# Patient Record
Sex: MALE | Race: OTHER | HISPANIC OR LATINO
[De-identification: names, ages, dates, MRNs, and addresses within clinical notes are randomized per-mention and may not be internally consistent; named-entity substitution may affect disease eponyms.]

---

## 2017-03-07 ENCOUNTER — OTHER (OUTPATIENT)
Age: 22
End: 2017-03-07

## 2019-06-10 ENCOUNTER — EMERGENCY (EMERGENCY)
Facility: HOSPITAL | Age: 24
LOS: 1 days | Discharge: HOME | End: 2019-06-10
Admitting: EMERGENCY MEDICINE
Payer: COMMERCIAL

## 2019-06-10 VITALS
HEIGHT: 77 IN | TEMPERATURE: 98 F | RESPIRATION RATE: 20 BRPM | WEIGHT: 220.02 LBS | SYSTOLIC BLOOD PRESSURE: 121 MMHG | HEART RATE: 64 BPM | OXYGEN SATURATION: 98 % | DIASTOLIC BLOOD PRESSURE: 84 MMHG

## 2019-06-10 PROCEDURE — 99283 EMERGENCY DEPT VISIT LOW MDM: CPT | Mod: 25

## 2019-06-10 PROCEDURE — 99283 EMERGENCY DEPT VISIT LOW MDM: CPT

## 2019-06-10 RX ORDER — POLYMYXIN B SULF/TRIMETHOPRIM 10000-1/ML
1 DROPS OPHTHALMIC (EYE)
Qty: 10 | Refills: 0
Start: 2019-06-10 | End: 2019-06-16

## 2019-06-10 NOTE — ED ADULT NURSE NOTE - OBJECTIVE STATEMENT
22 y/o pt AOx3 arrived to the ED c/o having right eye redness that he noticed when he woke up at 5am. Pt states when he went to bed last night he didn't have any symptoms.

## 2019-06-10 NOTE — ED PROVIDER NOTE - NSFOLLOWUPINSTRUCTIONS_ED_ALL_ED_FT
Conjunctivitis    WHAT YOU NEED TO KNOW:    Conjunctivitis, or pink eye, is inflammation of your conjunctiva. The conjunctiva is a thin tissue that covers the front of your eye and the back of your eyelids. The conjunctiva helps protect your eye and keep it moist. Conjunctivitis may be caused by bacteria, allergies, or a virus. If your conjunctivitis is caused by bacteria, it may get better on its own in about 7 days. Viral conjunctivitis can last up to 3 weeks. Conjunctivitis         DISCHARGE INSTRUCTIONS:    Return to the emergency department if:     You have worsening eye pain.       The swelling in your eye gets worse, even after treatment.       Your vision suddenly becomes worse or you cannot see at all.    Contact your healthcare provider if:     You develop a fever and ear pain.      You have tiny bumps or spots of blood on your eye.      You have questions or concerns about your condition or care.    Manage your symptoms:     Apply a cool compress. Wet a washcloth with cold water and place it on your eye. This will help decrease itching and irritation.      Do not wear contact lenses. They can irritate your eye. Throw away the pair you are using and ask when you can wear them again. Use a new pair of lenses when your healthcare provider says it is okay.       Avoid irritants. Stay away from smoke filled areas. Shield your eyes from wind and sun.       Flush your eye. You may need to flush your eye with saline to help decrease your symptoms. Ask for more information on how to flush your eye.     Medicines: Treatment depends on what is causing your conjunctivitis. You maybe given any of the following:    Allergy medicine helps decrease itchy, red, swollen eyes caused by allergies. It may be given as a pill, eye drops, or nasal spray.      Antibiotics may be needed if your conjunctivitis is caused by bacteria. This medicine may be given as a pill, eye drops, or eye ointment.      Take your medicine as directed. Contact your healthcare provider if you think your medicine is not helping or if you have side effects. Tell him or her if you are allergic to any medicine. Keep a list of the medicines, vitamins, and herbs you take. Include the amounts, and when and why you take them. Bring the list or the pill bottles to follow-up visits. Carry your medicine list with you in case of an emergency.    Prevent the spread of conjunctivitis:     Wash your hands with soap and water often. Wash your hands before and after you touch your eyes. Also wash your hands before you prepare or eat food and after you use the bathroom or change a diaper.      Avoid allergens. Try to avoid the things that cause your allergies, such as pets, dust, or grass.       Avoid contact with others. Do not share towels or washcloths. Try to stay away from others as much as possible. Ask when you can return to work or school.       Throw away eye makeup. The bacteria that caused your conjunctivitis can stay in eye makeup. Throw away mascara and other eye makeup

## 2019-06-10 NOTE — ED PROVIDER NOTE - EYES, MLM
right eye- +sclera injection, PERRLA, EOMI, visual acuity intact, fluorescein stain with no uptake, no abrasion/ulcer/fb

## 2019-06-10 NOTE — ED PROVIDER NOTE - OBJECTIVE STATEMENT
24 y/o m presents with right eye redness and discomfort today.  Pt stating feels uncomfortable, no discharge from eye, no contact lenses, no photophobia.

## 2019-06-10 NOTE — ED ADULT NURSE NOTE - NSIMPLEMENTINTERV_GEN_ALL_ED
Implemented All Universal Safety Interventions:  [place] to call system. Call bell, personal items and telephone within reach. Instruct patient to call for assistance. Room bathroom lighting operational. Non-slip footwear when patient is off stretcher. Physically safe environment: no spills, clutter or unnecessary equipment. Stretcher in lowest position, wheels locked, appropriate side rails in place.

## 2019-06-10 NOTE — ED PROVIDER NOTE - CLINICAL SUMMARY MEDICAL DECISION MAKING FREE TEXT BOX
22 y/o m right eye redness and discomfort today; exam with sclera injection, otherwise unremarkable, visual acuity intact, will treat with polytrim drops, f/u ophthalomology

## 2019-06-14 DIAGNOSIS — H10.9 UNSPECIFIED CONJUNCTIVITIS: ICD-10-CM

## 2019-06-14 DIAGNOSIS — H57.11 OCULAR PAIN, RIGHT EYE: ICD-10-CM

## 2024-12-26 NOTE — ED ADULT NURSE NOTE - GENITOURINARY ASSESSMENT
Rest  Drink plenty of fluids  Humidity and steamy showers  Saline nasal spray  Take antibiotics with food to avoid stomach upset  Continue over the counter medication as needed for comfort   Avoid decongestants if you have a history of elevated blood pressure     Seek medical attention if symptoms get worse or you do not feel improved in the next few days     Medical compliance with plan discussed and risks of noncompliance reviewed    Patient verbalizes understanding of treatment plan  Please follow up with your primary doctor as needed      Thank you for visiting [facility].     Jayla Ernst FNP  Family Nurse Practitioner      [facility]